# Patient Record
Sex: MALE | Race: BLACK OR AFRICAN AMERICAN | Employment: OTHER | URBAN - METROPOLITAN AREA
[De-identification: names, ages, dates, MRNs, and addresses within clinical notes are randomized per-mention and may not be internally consistent; named-entity substitution may affect disease eponyms.]

---

## 2018-04-20 ENCOUNTER — APPOINTMENT (OUTPATIENT)
Dept: ULTRASOUND IMAGING | Age: 58
End: 2018-04-20
Attending: EMERGENCY MEDICINE
Payer: MEDICARE

## 2018-04-20 ENCOUNTER — APPOINTMENT (OUTPATIENT)
Dept: CT IMAGING | Age: 58
End: 2018-04-20
Attending: EMERGENCY MEDICINE
Payer: MEDICARE

## 2018-04-20 ENCOUNTER — HOSPITAL ENCOUNTER (EMERGENCY)
Age: 58
Discharge: HOME | End: 2018-04-20
Attending: EMERGENCY MEDICINE
Payer: MEDICARE

## 2018-04-20 VITALS
SYSTOLIC BLOOD PRESSURE: 218 MMHG | OXYGEN SATURATION: 96 % | TEMPERATURE: 99.5 F | RESPIRATION RATE: 14 BRPM | DIASTOLIC BLOOD PRESSURE: 112 MMHG | HEART RATE: 107 BPM

## 2018-04-20 DIAGNOSIS — R93.89 ABNORMAL CT SCAN: ICD-10-CM

## 2018-04-20 DIAGNOSIS — N18.6 ESRD (END STAGE RENAL DISEASE) (HCC): ICD-10-CM

## 2018-04-20 DIAGNOSIS — R19.7 DIARRHEA, UNSPECIFIED TYPE: ICD-10-CM

## 2018-04-20 DIAGNOSIS — R11.2 NON-INTRACTABLE VOMITING WITH NAUSEA, UNSPECIFIED VOMITING TYPE: Primary | ICD-10-CM

## 2018-04-20 DIAGNOSIS — R03.0 ELEVATED BLOOD PRESSURE READING: ICD-10-CM

## 2018-04-20 LAB
ALBUMIN SERPL-MCNC: 2.7 G/DL (ref 3.4–5)
ALBUMIN/GLOB SERPL: 0.5 {RATIO} (ref 0.8–1.7)
ALP SERPL-CCNC: 46 U/L (ref 45–117)
ALT SERPL-CCNC: 23 U/L (ref 16–61)
ANION GAP SERPL CALC-SCNC: 12 MMOL/L (ref 3–18)
AST SERPL-CCNC: 48 U/L (ref 15–37)
BASOPHILS # BLD: 0 K/UL (ref 0–0.06)
BASOPHILS NFR BLD: 0 % (ref 0–2)
BILIRUB SERPL-MCNC: 0.7 MG/DL (ref 0.2–1)
BUN SERPL-MCNC: 32 MG/DL (ref 7–18)
BUN/CREAT SERPL: 6 (ref 12–20)
CALCIUM SERPL-MCNC: 7.5 MG/DL (ref 8.5–10.1)
CHLORIDE SERPL-SCNC: 102 MMOL/L (ref 100–108)
CO2 SERPL-SCNC: 21 MMOL/L (ref 21–32)
CREAT SERPL-MCNC: 5.7 MG/DL (ref 0.6–1.3)
DIFFERENTIAL METHOD BLD: ABNORMAL
EOSINOPHIL # BLD: 0 K/UL (ref 0–0.4)
EOSINOPHIL NFR BLD: 1 % (ref 0–5)
ERYTHROCYTE [DISTWIDTH] IN BLOOD BY AUTOMATED COUNT: 13.8 % (ref 11.6–14.5)
GLOBULIN SER CALC-MCNC: 5.6 G/DL (ref 2–4)
GLUCOSE SERPL-MCNC: 81 MG/DL (ref 74–99)
HCT VFR BLD AUTO: 36.3 % (ref 36–48)
HGB BLD-MCNC: 11.8 G/DL (ref 13–16)
LIPASE SERPL-CCNC: 64 U/L (ref 73–393)
LYMPHOCYTES # BLD: 0.7 K/UL (ref 0.9–3.6)
LYMPHOCYTES NFR BLD: 9 % (ref 21–52)
MCH RBC QN AUTO: 30.3 PG (ref 24–34)
MCHC RBC AUTO-ENTMCNC: 32.5 G/DL (ref 31–37)
MCV RBC AUTO: 93.1 FL (ref 74–97)
MONOCYTES # BLD: 0.4 K/UL (ref 0.05–1.2)
MONOCYTES NFR BLD: 4 % (ref 3–10)
NEUTS SEG # BLD: 7.2 K/UL (ref 1.8–8)
NEUTS SEG NFR BLD: 86 % (ref 40–73)
PLATELET # BLD AUTO: 185 K/UL (ref 135–420)
PMV BLD AUTO: 10.4 FL (ref 9.2–11.8)
POTASSIUM SERPL-SCNC: 3.4 MMOL/L (ref 3.5–5.5)
PROT SERPL-MCNC: 8.3 G/DL (ref 6.4–8.2)
RBC # BLD AUTO: 3.9 M/UL (ref 4.7–5.5)
SODIUM SERPL-SCNC: 135 MMOL/L (ref 136–145)
WBC # BLD AUTO: 8.3 K/UL (ref 4.6–13.2)

## 2018-04-20 PROCEDURE — 83690 ASSAY OF LIPASE: CPT

## 2018-04-20 PROCEDURE — 74011250636 HC RX REV CODE- 250/636: Performed by: EMERGENCY MEDICINE

## 2018-04-20 PROCEDURE — 85025 COMPLETE CBC W/AUTO DIFF WBC: CPT

## 2018-04-20 PROCEDURE — 96361 HYDRATE IV INFUSION ADD-ON: CPT

## 2018-04-20 PROCEDURE — 74011250637 HC RX REV CODE- 250/637: Performed by: EMERGENCY MEDICINE

## 2018-04-20 PROCEDURE — 99284 EMERGENCY DEPT VISIT MOD MDM: CPT

## 2018-04-20 PROCEDURE — 76705 ECHO EXAM OF ABDOMEN: CPT

## 2018-04-20 PROCEDURE — 80053 COMPREHEN METABOLIC PANEL: CPT

## 2018-04-20 PROCEDURE — 74176 CT ABD & PELVIS W/O CONTRAST: CPT

## 2018-04-20 PROCEDURE — 96374 THER/PROPH/DIAG INJ IV PUSH: CPT

## 2018-04-20 RX ORDER — ONDANSETRON 2 MG/ML
4 INJECTION INTRAMUSCULAR; INTRAVENOUS
Status: DISCONTINUED | OUTPATIENT
Start: 2018-04-20 | End: 2018-04-20

## 2018-04-20 RX ORDER — HYDRALAZINE HYDROCHLORIDE 25 MG/1
50 TABLET, FILM COATED ORAL ONCE
Status: COMPLETED | OUTPATIENT
Start: 2018-04-20 | End: 2018-04-20

## 2018-04-20 RX ORDER — ONDANSETRON 4 MG/1
TABLET, ORALLY DISINTEGRATING ORAL
Qty: 10 TAB | Refills: 0 | Status: SHIPPED | OUTPATIENT
Start: 2018-04-20

## 2018-04-20 RX ORDER — PROCHLORPERAZINE EDISYLATE 5 MG/ML
10 INJECTION INTRAMUSCULAR; INTRAVENOUS ONCE
Status: COMPLETED | OUTPATIENT
Start: 2018-04-20 | End: 2018-04-20

## 2018-04-20 RX ORDER — CLONIDINE HYDROCHLORIDE 0.1 MG/1
0.1 TABLET ORAL
Status: COMPLETED | OUTPATIENT
Start: 2018-04-20 | End: 2018-04-20

## 2018-04-20 RX ADMIN — SODIUM CHLORIDE 500 ML: 900 INJECTION, SOLUTION INTRAVENOUS at 13:44

## 2018-04-20 RX ADMIN — CLONIDINE HYDROCHLORIDE 0.1 MG: 0.1 TABLET ORAL at 16:19

## 2018-04-20 RX ADMIN — PROCHLORPERAZINE EDISYLATE 10 MG: 5 INJECTION INTRAMUSCULAR; INTRAVENOUS at 13:43

## 2018-04-20 RX ADMIN — HYDRALAZINE HYDROCHLORIDE 50 MG: 25 TABLET, FILM COATED ORAL at 16:19

## 2018-04-20 NOTE — ED PROVIDER NOTES
EMERGENCY DEPARTMENT HISTORY AND PHYSICAL EXAM    1:27 PM      Date: 4/20/2018  Patient Name: Elton Clayton. History of Presenting Illness     Chief Complaint   Patient presents with    Abdominal Pain         History Provided By: Patient    Chief Complaint: pain   Duration:  Since last night, 0.5 day  Timing:  Constant  Location: RUQ  Severity: Moderate  Modifying Factors: 1 hour after eating hot dogs  Associated Symptoms: emesis, diarrhea, lightheadedness      Additional History (Context): Elton Modi, a 56 y.o. Male, nonsmoker, nondrinker, non-substance abuser with no SHx but with PMHx of ESRD, on dialysis tuesdays,thursdays and saturdays with last session fully completed 1 day ago, not currently on any anticoagulants, presents to the ED c/o constant, moderate severity pain to his RUQ, associated with lightheadedness, 6 episodes of emesis and 8 episodes of diarrhea since last night, 1 hour after he ate some hot dogs. No one else ate the same food with the pt. Reports mild blood streaking in emesis. Denies abnormal stool colors. Cp and fevers are denied. Pt still has not had any fluids today. He denies other complaints at this time. PCP: None    Current Facility-Administered Medications   Medication Dose Route Frequency Provider Last Rate Last Dose    cloNIDine HCl (CATAPRES) tablet 0.1 mg  0.1 mg Oral NOW Kareem Wiseman MD        hydrALAZINE (APRESOLINE) tablet 50 mg  50 mg Oral ONCE Kareem Wiseman MD         Current Outpatient Prescriptions   Medication Sig Dispense Refill    ondansetron (ZOFRAN ODT) 4 mg disintegrating tablet Take 1-2 tablets every 6-8 hours as needed for nausea and vomiting. 10 Tab 0    bicalutamide (CASODEX) 50 mg tablet Take 1 tablet by mouth daily. 90 tablet 3    acetaminophen-codeine (TYLENOL-CODEINE #3) 300-30 mg per tablet Take 1 tablet by mouth every four (4) hours as needed for Pain. 30 tablet 0    aspirin delayed-release 81 mg tablet 81 mg.       prednisone (DELTASONE) 10 mg tablet 10 mg.      isosorbide mononitrate ER (IMDUR) 60 mg CR tablet 60 mg.      isosorbide mononitrate ER (IMDUR) 60 mg CR tablet 60 mg.      hydrocodone-acetaminophen (NORCO) 5-325 mg per tablet       hydrocodone-acetaminophen (NORCO) 5-325 mg per tablet       hydralazine (APRESOLINE) 25 mg tablet 25 mg.      hydralazine (APRESOLINE) 25 mg tablet 25 mg.      gabapentin (NEURONTIN) 100 mg capsule 100 mg. Past History     Past Medical History:  Past Medical History:   Diagnosis Date    [facility]est[facility] failure) [facility]     Encounter for long-term (current) use of other high-risk medications     ESRD (end stage renal disease) ([facility]     Hematuria     Hepatitis C     HTN (hypertension)     Ill-defined condition     hemodialysis    [facility]        Past Surgical History:  No past surgical history on file. Family History:  Family History   Problem Relation Age of Onset   [address] [facility] Hypertension Mother     Diabetes Mother     Hypertension Father     Cancer Father      prostate       Social History:  Social History   Substance Use Topics    Smoking status: Former Smoker     Packs/day: 0.50     Years: 25.00     Types: Cigarettes     Quit date: 4/23/2014    Smokeless tobacco: Never Used    Alcohol use 0.0 oz/week     0 Standard drinks or equivalent per week       Allergies:  No Known Allergies      Review of Systems       Review of Systems   Constitutional: Negative for chills and fever. HENT: Negative for congestion, rhinorrhea and sore throat. Respiratory: Negative for cough and shortness of breath. Cardiovascular: Negative for chest pain. Gastrointestinal: Positive for abdominal pain (RUQ), diarrhea and vomiting. Negative for blood in stool, constipation and nausea. Genitourinary: Negative for dysuria, frequency and hematuria. Musculoskeletal: Negative for back pain and myalgias. Skin: Negative for rash and wound.    Neurological: Positive for light-headedness. Negative for dizziness and headaches. All other systems reviewed and are negative. Physical Exam     Visit Vitals    BP (!) 218/112    Pulse (!) 107    Temp 99.5 °F (37.5 °C)    Resp 14    SpO2 96%       Physical Exam    General Exam: Patient is a well developed and well nourished in no distress. Patient does not appear acutely ill or toxic. Eye Exam: Lids and conjunctiva are normal  ENT Exam: The general head and facial exam is normal.  The neck is supple without meningeal signs. No significant adenopathy. Pulmonary Exam: No respiratory distress. The respiratory rate is normal.  No stridor. The breath sounds are equal bilaterally. There are no wheezes, rales, or rhonchi noted. Cardiac Exam: The cardiac rate and rhythm are normal.  No significant murmurs, rubs, or gallops. The peripheral pulses are normal.  Abdominal Exam: Abdomen is soft and non-distended. No pulsatile masses. There is RUQ tenderness. There is no rebound or guarding noted. Skin and Soft Tissue: The skin is warm and dry, without significant abnormality. Good color. Musculoskeletal Exam: No peripheral edema. The musculoskeletal exam of the lower extremities is normal without significant local tenderness. Dialysis fistula to LUE with audible thrill. Psychiatric: Normal adult with appropriate demeanor and interpersonal interaction. Is oriented to person, place, and time.     Diagnostic Study Results     Labs -  Recent Results (from the past 12 hour(s))   CBC WITH AUTOMATED DIFF    Collection Time: 04/20/18  1:34 PM   Result Value Ref Range    WBC 8.3 4.6 - 13.2 K/uL    RBC 3.90 (L) 4.70 - 5.50 M/uL    HGB 11.8 (L) 13.0 - 16.0 g/dL    HCT 36.3 36.0 - 48.0 %    MCV 93.1 74.0 - 97.0 FL    MCH 30.3 24.0 - 34.0 PG    MCHC 32.5 31.0 - 37.0 g/dL    RDW 13.8 11.6 - 14.5 %    PLATELET 901 052 - 940 K/uL    MPV 10.4 9.2 - 11.8 FL    NEUTROPHILS 86 (H) 40 - 73 %    LYMPHOCYTES 9 (L) 21 - 52 % MONOCYTES 4 3 - 10 %    EOSINOPHILS 1 0 - 5 %    BASOPHILS 0 0 - 2 %    ABS. NEUTROPHILS 7.2 1.8 - 8.0 K/UL    ABS. LYMPHOCYTES 0.7 (L) 0.9 - 3.6 K/UL    ABS. MONOCYTES 0.4 0.05 - 1.2 K/UL    ABS. EOSINOPHILS 0.0 0.0 - 0.4 K/UL    ABS. BASOPHILS 0.0 0.0 - 0.06 K/UL    DF AUTOMATED     METABOLIC PANEL, COMPREHENSIVE    Collection Time: 04/20/18  1:34 PM   Result Value Ref Range    Sodium 135 (L) 136 - 145 mmol/L    Potassium 3.4 (L) 3.5 - 5.5 mmol/L    Chloride 102 100 - 108 mmol/L    CO2 21 21 - 32 mmol/L    Anion gap 12 3.0 - 18 mmol/L    Glucose 81 74 - 99 mg/dL    BUN 32 (H) 7.0 - 18 MG/DL    Creatinine 5.70 (H) 0.6 - 1.3 MG/DL    BUN/Creatinine ratio 6 (L) 12 - 20      GFR est AA 13 (L) >60 ml/min/1.73m2    GFR est non-AA 10 (L) >60 ml/min/1.73m2    Calcium 7.5 (L) 8.5 - 10.1 MG/DL    Bilirubin, total 0.7 0.2 - 1.0 MG/DL    ALT (SGPT) 23 16 - 61 U/L    AST (SGOT) 48 (H) 15 - 37 U/L    Alk. phosphatase 46 45 - 117 U/L    Protein, total 8.3 (H) 6.4 - 8.2 g/dL    Albumin 2.7 (L) 3.4 - 5.0 g/dL    Globulin 5.6 (H) 2.0 - 4.0 g/dL    A-G Ratio 0.5 (L) 0.8 - 1.7     LIPASE    Collection Time: 04/20/18  1:34 PM   Result Value Ref Range    Lipase 64 (L) 73 - 393 U/L       Radiologic Studies -   US ABD LTD   Final Result      CT ABD PELV WO CONT   Final Result        Ct Abd Pelv Wo Cont  ---------------------------------------------------------------------------------------------------------------------------------  IMPRESSION: 1. Acute left lower lobe patchy opacity - acute pulmonary infectious infiltrate, aspiration pneumonitis, versus pulmonary hemorrhage. No acute abnormality throughout the abdomen or pelvis to explain patient's abdominal pain, nausea or vomiting. No bowel obstruction. No CT findings of gastritis or enterocolitis.  2. Suspicious patchy sclerotic changes throughout several vertebral bodies and within the osseous pelvis, most suggestive of osseous metastatic disease; suspected primary neoplasm is not clearly visualized on this exam. Whole-body bone scan could better evaluate the extent of suspected osseous metastatic disease. [address]  ------------------------------------------------------------------------------------------------------------------------------  IMPRESSION: 1. Heterogeneous, coarse hepatic echotexture suggestive of intrinsic liver disease such as fibrosis. No focal hepatic mass. 2. No evidence for cholelithiasis and negative sonographic Shepherd's sign. Mild thickening of the gallbladder wall adjacent to the liver which can be seen with liver disease. 3. Thinning of right renal cortex and increased cortical echotexture suggestive of medical renal disease. Medical Decision Making   I am the first provider for this patient. I reviewed the vital signs, available nursing notes, past medical history, past surgical history, family history and social history. Vital Signs-Reviewed the patient's vital signs. Pulse Oximetry Analysis -  97% on room air (Interpretation) Non-hypoxic     Records Reviewed: Nursing Notes and Old Medical Records (Time of Review: 1:27 PM)    ED Course: Progress Notes, Reevaluation, and Consults:    Provider Notes (Medical Decision Making): Pt with 1 day of N/V/D. RUQ pain. No signs of acute infection on CT or U/S or acute gallbladder pathology. Abnormal bone lesions noted. Pt aware of this finding. No vomiting or diarrhea in ED. Labs reviewed. Pt missed afternoon BP meds. Will give him his dose here but no symptoms to warrant need for lowering with IV meds. CT comments on lung abnormality - No hemoptysis or cough or respiratory symptoms to suggest need for antibiotics at this time. Pt comfortable with plan for discharge. Will go to dialysis tomorrow. Advised on return precautions. 3:47 PM Pt is feeling better after medication. He is aware that ct results show abnormal lesions in the bone.  Reports hx of prostate cancer but deneis knowing of previous abnormalities in the bones. He is aware that he needs close follow up for these lesions as they could be related to prostate cancer or other types of cancer. 3:59 PM Pt takes bp meds at 3 in the afternoon. He was able to keep down his morning dose today but missed the afternoon bp medication dose. Diagnosis     Clinical Impression:   1. Non-intractable vomiting with nausea, unspecified vomiting type    2. Abnormal CT scan    3. Diarrhea, unspecified type    4. ESRD (end stage renal disease) ([address]    5. Elevated blood pressure reading        Disposition: Discharged    Follow-up Information     Follow up With Details Comments Contact Info    [facility] EMERGENCY DEPT  If symptoms worsen [address] [address] [phone]           Patient's Medications   Start Taking    ONDANSETRON (ZOFRAN ODT) 4 MG DISINTEGRATING TABLET    Take 1-2 tablets every 6-8 hours as needed for nausea and vomiting. Continue Taking    ACETAMINOPHEN-CODEINE (TYLENOL-CODEINE #3) 300-30 MG PER TABLET    Take 1 tablet by mouth every four (4) hours as needed for Pain. ASPIRIN DELAYED-RELEASE 81 MG TABLET    81 mg. BICALUTAMIDE (CASODEX) 50 MG TABLET    Take 1 tablet by mouth daily. GABAPENTIN (NEURONTIN) 100 MG CAPSULE    100 mg. HYDRALAZINE (APRESOLINE) 25 MG TABLET    25 mg. HYDRALAZINE (APRESOLINE) 25 MG TABLET    25 mg. HYDROCODONE-ACETAMINOPHEN (NORCO) 5-325 MG PER TABLET        HYDROCODONE-ACETAMINOPHEN (NORCO) 5-325 MG PER TABLET        ISOSORBIDE MONONITRATE ER (IMDUR) 60 MG CR TABLET    60 mg.    ISOSORBIDE MONONITRATE ER (IMDUR) 60 MG CR TABLET    60 mg. PREDNISONE (DELTASONE) 10 MG TABLET    10 mg.    These Medications have changed    No medications on file   Stop Taking    No medications on file     _______________________________    Attestations:  Jem Ruiz acting as a scribe for and in the presence of Melissa Bach MD      April 20, 2018 at 1:27 PM       Provider Attestation:      I personally performed the services described in the documentation, reviewed the documentation, as recorded by the scribe in my presence, and it accurately and completely records my words and actions.  April 20, 2018 at 1:27 PM - Gavino Phillip MD    _______________________________

## 2018-04-20 NOTE — ED TRIAGE NOTES
NVD since last night. Spitting blood. HD pt. Full run yesterday at [facility].  HR 90,  /110

## 2018-04-20 NOTE — DISCHARGE INSTRUCTIONS
There are abnormal lesions that were seen in the bones on your CT scan today. The radiologist recommends you have a whole body bone scan to better look at these concerning lesions. Please follow up with your primary care doctor to arrange this.